# Patient Record
(demographics unavailable — no encounter records)

---

## 2018-12-18 NOTE — EMERGENCY ROOM REPORT
History of Present Illness


General


Chief Complaint:  Dizziness


Source:  Patient, Family Member





Present Illness


HPI


17-year-old male presents to the emergency department complaining of shakiness, 

feeling dizzy/lightheaded and having nausea since this morning.  Patient denies 

vomiting he states he is not eating as much because he feels as though it will 

make him vomit.  Patient denies abdominal pain,Neck pain/stiffness, cough, 

chest pain, palpitations or history of cardiac problems.  He denies recent 

travel denies fevers. He denies drug use. patient reports mild headache to the 

left frontal area he states that this is approximately 2 out of 10 in severity 

and does not bother him as much as his other symptoms.  Denies changes in 

vision or hearing. Denies vertigo.


Allergies:  


Coded Allergies:  


     No Known Allergies (Unverified , 18)





Patient History


Past Medical History:  see triage record


Past Surgical History:  none


Pertinent Family History:  none


Immunizations:  UTD


Reviewed Nursing Documentation:  PMH: Agreed; PSxH: Agreed





Nursing Documentation-PMH


Past Medical History:  No Stated History





Review of Systems


All Other Systems:  negative except mentioned in HPI





Physical Exam





Vital Signs








  Date Time  Temp Pulse Resp B/P (MAP) Pulse Ox O2 Delivery O2 Flow Rate FiO2


 


18 15:33 100.0 111 20 98/58 (71) 99 Room Air  








Sp02 EP Interpretation:  reviewed, normal


General Appearance:  no apparent distress, alert, GCS 15, non-toxic


Head:  normocephalic, atraumatic


Eyes:  bilateral eye normal inspection, bilateral eye PERRL


ENT:  hearing grossly normal, normal voice


Neck:  full range of motion


Respiratory:  chest non-tender, lungs clear, normal breath sounds, speaking 

full sentences


Cardiovascular #1:  regular rate, rhythm, tachycardia


Musculoskeletal:  back normal, gait/station normal, normal range of motion, non-

tender


Neurologic:  alert, oriented x3, responsive, motor strength/tone normal, 

sensory intact, normal gait, speech normal, other - no motor weakness, grossly 

normal


Psychiatric:  judgement/insight normal


Skin:  normal color, no rash, warm/dry, well hydrated


Lymphatic:  no adenopathy





Medical Decision Making


PA Attestation


Dr. Gregorio is my supervising Physician whom patient management has been discussed 

with.


Diagnostic Impression:  


 Primary Impression:  


 Dizziness


 Additional Impressions:  


 Dehydration


 Nausea


ER Course


17-year-old male presents to the emergency department complaining of shakiness, 

feeling dizzy/lightheaded and having nausea since this morning.  Patient denies 

vomiting he states he is not eating as much because he feels as though it will 

make him vomit.  Patient denies abdominal pain,Neck pain/stiffness, cough, 

chest pain, palpitations or history of cardiac problems.  He denies recent 

travel denies fevers. He denies drug use. patient reports mild headache to the 

left frontal area he states that this is approximately 2 out of 10 in severity 

and does not bother him as much as his other symptoms.  Denies changes in 

vision or hearing. Denies vertigo.





Ddx considered but are not limited to Mnire's, BPPV, labrinitis, cerebellar 

stroke, hypovolemia, cardiac cause.





Vital signs: are WNL, pt. is afebrile 





H&PE are most consistent with : viral syndrome causing dehydration.  





** No focal deficit to indicate TIA or CVA.  No vertical nystagmus.  Better 

after IV fluid and Ativan.  Because of lack of focality and red flags, I see no 

need for CT scan. EKG was normal, symptoms most likely secondary to dehydration.





ORDERS: 


EK sinus tachycardia.





ED INTERVENTIONS:  





1  Liter of NS fluids


-Tylenol PO








-Pt. reports his symptoms have resolved with ED interventions.  Mother and Pt. 

are given close-outpatient follow up and strict ED return instructions. 





DISCHARGE: At this time pt. is stable for d/c to home. Will provide printed 

patient care instructions, and any necessary prescriptions. Care plan and 

follow up instructions have been discussed with the patient prior to discharge.


EKG Diagnostic Results


EP Interpretation:  Dr. Rosario


Rate:  tachycardiac


Rhythm:  NSR


ST Segments:  no acute changes


ASA given to the pt in ED:  No


PA Scribe Text


This Interpretation was scribed by JAVIER James.





Last Vital Signs








  Date Time  Temp Pulse Resp B/P (MAP) Pulse Ox O2 Delivery O2 Flow Rate FiO2


 


18 15:33 100.0 111 20 98/58 (71) 99 Room Air  








Disposition:  HOME, SELF-CARE


Condition:  Stable


Scripts


Acetaminophen* (TYLENOL EXTRA STRENGTH*) 500 Mg Tablet


500 MG ORAL Q6H PRN for Mild Pain/Temp > 100.5, #20 TAB 0 Refills


   Prov: Pippa James         18 


Ondansetron Odt* (ZOFRAN ODT*) 8 Mg Tab.rapdis


8 MG ORAL Q6H PRN for Nausea & Vomiting, #12 TAB


   Prov: Pippa James         18


Departure Forms:  Return to Work      Return to Work Date:  Dec 22, 2018


   Work Restrictions:  None


   Other Restrictions:  May return Sooner if Symptoms have resolved. 


   Return to Full Activity:  Dec 22, 2018


Patient Instructions:  Dehydration, Adult, Easy-to-Read, Dizziness





Additional Instructions:  


Take medications as directed. 





 ** Follow up with a Primary Care Provider in 3-5 days, even if your symptoms 

have resolved. ** 


--Please review list of primary care clinics, if you do not already have a 

primary care provider





Return sooner to ED if new symptoms occur, or current symptoms become worse. 








- Please note that this Emergency Department Report was dictated using [employer] technology software, occasionally this can lead to 

erroneous entry secondary to interpretation by the dictation equipment.











Pippa James Dec 18, 2018 15:42

## 2018-12-19 NOTE — CARDIOLOGY REPORT
--------------- APPROVED REPORT --------------





EKG Measurement

Heart [id number]

[id number]

[id number]

[id number]

[id number]





Sinus tachycardia

Otherwise normal ECG